# Patient Record
Sex: MALE | URBAN - METROPOLITAN AREA
[De-identification: names, ages, dates, MRNs, and addresses within clinical notes are randomized per-mention and may not be internally consistent; named-entity substitution may affect disease eponyms.]

---

## 2024-04-23 ENCOUNTER — OFFICE VISIT (OUTPATIENT)
Age: 39
End: 2024-04-23

## 2024-04-23 VITALS
BODY MASS INDEX: 38.43 KG/M2 | HEIGHT: 73 IN | OXYGEN SATURATION: 98 % | WEIGHT: 290 LBS | SYSTOLIC BLOOD PRESSURE: 132 MMHG | TEMPERATURE: 97.8 F | HEART RATE: 86 BPM | DIASTOLIC BLOOD PRESSURE: 87 MMHG

## 2024-04-23 DIAGNOSIS — Z75.8 DOES NOT HAVE PRIMARY CARE PROVIDER: ICD-10-CM

## 2024-04-23 DIAGNOSIS — S96.911A STRAIN OF RIGHT FOOT, INITIAL ENCOUNTER: Primary | ICD-10-CM

## 2024-04-23 RX ORDER — METHYLPREDNISOLONE 4 MG/1
TABLET ORAL
Qty: 1 KIT | Refills: 0 | Status: SHIPPED | OUTPATIENT
Start: 2024-04-23 | End: 2024-04-29

## 2024-04-23 ASSESSMENT — ENCOUNTER SYMPTOMS
COUGH: 0
SHORTNESS OF BREATH: 0
COLOR CHANGE: 0

## 2024-04-23 NOTE — PATIENT INSTRUCTIONS
limited activities as discussed..   take tylenol (unless has contraindications)  as needed for pain... ....use crutches for support/pain relief         can go to local/walk in \"ortho urgent care\" office). Referral to ortho provided    Return sooner or go to the ER if symptoms worse/feeling worse or has new symptoms or concerns

## 2024-04-26 ENCOUNTER — TELEPHONE (OUTPATIENT)
Age: 39
End: 2024-04-26

## 2024-04-26 NOTE — TELEPHONE ENCOUNTER
Finally able to reach patient  (repeated attempts (\"number could not be connected as dialed or number has changed\")..04/26/24 he answered phone: discussed x-ray report    No fracture, soft tissue swelling over top of foot  He says his foot is feeling better, able to bear weight, still using one crutch for support  He has not contacted ortho or primary care for further evaluation as advised at time of initial visit

## 2024-05-09 ENCOUNTER — OFFICE VISIT (OUTPATIENT)
Dept: ORTHOPEDIC SURGERY | Age: 39
End: 2024-05-09
Payer: COMMERCIAL

## 2024-05-09 VITALS — WEIGHT: 290 LBS | BODY MASS INDEX: 38.43 KG/M2 | HEIGHT: 73 IN

## 2024-05-09 DIAGNOSIS — M84.374A STRESS REACTION OF RIGHT FOOT, INITIAL ENCOUNTER: Primary | ICD-10-CM

## 2024-05-09 PROCEDURE — MISCD124 DARCO POST-OP SHOE BRACE (RETAIL): Performed by: ORTHOPAEDIC SURGERY

## 2024-05-09 PROCEDURE — 99243 OFF/OP CNSLTJ NEW/EST LOW 30: CPT | Performed by: ORTHOPAEDIC SURGERY

## 2024-05-30 ENCOUNTER — OFFICE VISIT (OUTPATIENT)
Dept: ORTHOPEDIC SURGERY | Age: 39
End: 2024-05-30
Payer: COMMERCIAL

## 2024-05-30 VITALS — WEIGHT: 290 LBS | HEIGHT: 73 IN | BODY MASS INDEX: 38.43 KG/M2

## 2024-05-30 DIAGNOSIS — M84.374A STRESS REACTION OF RIGHT FOOT, INITIAL ENCOUNTER: Primary | ICD-10-CM

## 2024-05-30 PROCEDURE — 99213 OFFICE O/P EST LOW 20 MIN: CPT | Performed by: ORTHOPAEDIC SURGERY

## 2024-05-30 NOTE — PROGRESS NOTES
who presents today in no acute distress, awake, alert, and oriented.  He is well dressed, nourished and  groomed.  Patient with normal affect.  Height is  1.854 m (6' 1\"), weight is 131.5 kg (290 lb), Body mass index is 38.26 kg/m².  Resting respiratory rate is 16.     Examination of the gait, showed that the patient walks heel-toe with no limp in postop shoe.  Examination of both ankles showing a mild decrease range of motion right ankle with foot swelling compare to the other side.  He has intact sensation and good pedal pulses.  He has good strength in all four planes, including eversion, and has no tenderness on deep palpation over the right 5th metatarsal shaft, without instability compared to the other side.  The ankles are stable to drawer test bilaterally, ankle reflex 1+ equally bilaterally.       IMAGING: Xray's were reviewed, 3 views of the right foot taken today, and showed no acute fracture. No other abnormality.    IMPRESSION:  Right lateral midfoot pain/ 5th metatarsal stress injury, possible stress fracture.    PLAN: I discussed with the patient the treatment options.  We recommended stretching exercises of the calf. He will take NSAID. Avoid heavy impact activities.  F/U in 4 weeks PRN with new standing xray right foot.       Ana Lilia Knott MD

## 2025-02-17 ENCOUNTER — HOSPITAL ENCOUNTER (EMERGENCY)
Age: 40
Discharge: HOME | End: 2025-02-17
Payer: COMMERCIAL

## 2025-02-17 VITALS
SYSTOLIC BLOOD PRESSURE: 174 MMHG | HEART RATE: 80 BPM | OXYGEN SATURATION: 99 % | WEIGHT: 295 LBS | DIASTOLIC BLOOD PRESSURE: 106 MMHG | RESPIRATION RATE: 16 BRPM | HEIGHT: 73 IN | BODY MASS INDEX: 39.1 KG/M2 | TEMPERATURE: 98 F

## 2025-02-17 DIAGNOSIS — R03.0 ELEVATED BLOOD PRESSURE READING: Primary | ICD-10-CM

## 2025-02-17 DIAGNOSIS — R51.9 INTERMITTENT HEADACHE: ICD-10-CM

## 2025-02-17 LAB
ANION GAP SERPL CALCULATED.3IONS-SCNC: 13 MMOL/L (ref 3–16)
BASOPHILS # BLD: 0 K/UL (ref 0–0.2)
BASOPHILS NFR BLD: 0.4 %
BILIRUB UR QL STRIP.AUTO: NEGATIVE
BUN SERPL-MCNC: 11 MG/DL (ref 7–20)
CALCIUM SERPL-MCNC: 9.6 MG/DL (ref 8.3–10.6)
CHLORIDE SERPL-SCNC: 101 MMOL/L (ref 99–110)
CLARITY UR: CLEAR
CO2 SERPL-SCNC: 24 MMOL/L (ref 21–32)
COLOR UR: YELLOW
CREAT SERPL-MCNC: 0.7 MG/DL (ref 0.9–1.3)
DEPRECATED RDW RBC AUTO: 12.7 % (ref 12.4–15.4)
EOSINOPHIL # BLD: 0.1 K/UL (ref 0–0.6)
EOSINOPHIL NFR BLD: 1.3 %
FLUAV RNA RESP QL NAA+PROBE: NOT DETECTED
FLUBV RNA RESP QL NAA+PROBE: NOT DETECTED
GFR SERPLBLD CREATININE-BSD FMLA CKD-EPI: >90 ML/MIN/{1.73_M2}
GLUCOSE SERPL-MCNC: 111 MG/DL (ref 70–99)
GLUCOSE UR STRIP.AUTO-MCNC: NEGATIVE MG/DL
HCT VFR BLD AUTO: 45.8 % (ref 40.5–52.5)
HGB BLD-MCNC: 16 G/DL (ref 13.5–17.5)
HGB UR QL STRIP.AUTO: NEGATIVE
KETONES UR STRIP.AUTO-MCNC: NEGATIVE MG/DL
LEUKOCYTE ESTERASE UR QL STRIP.AUTO: NEGATIVE
LYMPHOCYTES # BLD: 2.1 K/UL (ref 1–5.1)
LYMPHOCYTES NFR BLD: 23.2 %
MCH RBC QN AUTO: 32.1 PG (ref 26–34)
MCHC RBC AUTO-ENTMCNC: 35 G/DL (ref 31–36)
MCV RBC AUTO: 91.5 FL (ref 80–100)
MONOCYTES # BLD: 0.5 K/UL (ref 0–1.3)
MONOCYTES NFR BLD: 5.4 %
NEUTROPHILS # BLD: 6.3 K/UL (ref 1.7–7.7)
NEUTROPHILS NFR BLD: 69.7 %
NITRITE UR QL STRIP.AUTO: NEGATIVE
PH UR STRIP.AUTO: 6 [PH] (ref 5–8)
PLATELET # BLD AUTO: 240 K/UL (ref 135–450)
PMV BLD AUTO: 8.1 FL (ref 5–10.5)
POTASSIUM SERPL-SCNC: 4.6 MMOL/L (ref 3.5–5.1)
PROT UR STRIP.AUTO-MCNC: NEGATIVE MG/DL
RBC # BLD AUTO: 5 M/UL (ref 4.2–5.9)
SARS-COV-2 RNA RESP QL NAA+PROBE: NOT DETECTED
SODIUM SERPL-SCNC: 138 MMOL/L (ref 136–145)
SP GR UR STRIP.AUTO: 1.01 (ref 1–1.03)
TROPONIN, HIGH SENSITIVITY: <6 NG/L (ref 0–22)
UA COMPLETE W REFLEX CULTURE PNL UR: NORMAL
UA DIPSTICK W REFLEX MICRO PNL UR: NORMAL
URN SPEC COLLECT METH UR: NORMAL
UROBILINOGEN UR STRIP-ACNC: 0.2 E.U./DL
WBC # BLD AUTO: 9 K/UL (ref 4–11)

## 2025-02-17 PROCEDURE — 99284 EMERGENCY DEPT VISIT MOD MDM: CPT

## 2025-02-17 PROCEDURE — 85025 COMPLETE CBC W/AUTO DIFF WBC: CPT

## 2025-02-17 PROCEDURE — 84484 ASSAY OF TROPONIN QUANT: CPT

## 2025-02-17 PROCEDURE — 93005 ELECTROCARDIOGRAM TRACING: CPT | Performed by: EMERGENCY MEDICINE

## 2025-02-17 PROCEDURE — 87636 SARSCOV2 & INF A&B AMP PRB: CPT

## 2025-02-17 PROCEDURE — 81003 URINALYSIS AUTO W/O SCOPE: CPT

## 2025-02-17 PROCEDURE — 80048 BASIC METABOLIC PNL TOTAL CA: CPT

## 2025-02-17 ASSESSMENT — PAIN - FUNCTIONAL ASSESSMENT: PAIN_FUNCTIONAL_ASSESSMENT: 0-10

## 2025-02-17 ASSESSMENT — ENCOUNTER SYMPTOMS
SHORTNESS OF BREATH: 0
SORE THROAT: 0
COLOR CHANGE: 0
NAUSEA: 0
DIARRHEA: 0
RHINORRHEA: 0
VOMITING: 0
EYE REDNESS: 0
ABDOMINAL PAIN: 0
COUGH: 0

## 2025-02-17 ASSESSMENT — PAIN SCALES - GENERAL: PAINLEVEL_OUTOF10: 3

## 2025-02-17 ASSESSMENT — PAIN DESCRIPTION - DESCRIPTORS: DESCRIPTORS: DULL

## 2025-02-17 ASSESSMENT — PAIN DESCRIPTION - LOCATION: LOCATION: HEAD

## 2025-02-18 LAB
EKG ATRIAL RATE: 60 BPM
EKG DIAGNOSIS: NORMAL
EKG P AXIS: 44 DEGREES
EKG P-R INTERVAL: 168 MS
EKG Q-T INTERVAL: 420 MS
EKG QRS DURATION: 94 MS
EKG QTC CALCULATION (BAZETT): 420 MS
EKG R AXIS: 21 DEGREES
EKG T AXIS: 24 DEGREES
EKG VENTRICULAR RATE: 60 BPM

## 2025-02-18 PROCEDURE — 93010 ELECTROCARDIOGRAM REPORT: CPT | Performed by: INTERNAL MEDICINE

## 2025-02-18 NOTE — ED PROVIDER NOTES
Use [id number] or GENEDNOTE      [facility] EMERGENCY DEPARTMENT  EMERGENCY DEPARTMENT ENCOUNTER        Pt Name: Mike Samayoa  MRN: [id number]  Birthdate 1985  Date of evaluation: 2/17/2025  Provider: SUNNY Tadeo  PCP: Shiva Garza MD  Note Started: 11:31 PM EST 2/17/25      JOHN. I have evaluated this patient.        CHIEF COMPLAINT       Chief Complaint   Patient presents with    Hypertension     High BP at home recently. No history of HTN.        HISTORY OF PRESENT ILLNESS: 1 or more Elements     History From: Patient  Limitations to history : None    Mike Samayoa is a 39 y.o. male who presents to the emergency department due to concern for elevated blood pressure readings.  Patient states he has been checking his blood pressure at home, and lately it has been elevated.  Today he felt kind of weird, although has difficulty describing exactly what was going on.  He decided to check his blood pressure.  It was elevated.  He then took it multiple more times within the hour, and it continued to rise, to a maximum of 212/120, which is why he came in for evaluation.  Patient reports a feeling of pressure across the front of his head.  He has had this intermittently for the last few days.  He denies any acute head pain.  Patient denies past history of hypertension.  He is not on any medications.  He denies cardiac history.  He denies chest pain or shortness of breath.  Patient denies any focal neurologic deficits.  No other acute complaints.    Nursing Notes were all reviewed and agreed with or any disagreements were addressed in the HPI.    REVIEW OF SYSTEMS :      Review of Systems   Constitutional:  Negative for chills, fatigue and fever.   HENT:  Negative for congestion, rhinorrhea and sore throat.    Eyes:  Negative for redness.   Respiratory:  Negative for cough and shortness of breath.    Cardiovascular:  Negative for chest pain and palpitations.   Gastrointestinal:  Negative for

## 2025-02-23 SDOH — HEALTH STABILITY: PHYSICAL HEALTH: ON AVERAGE, HOW MANY MINUTES DO YOU ENGAGE IN EXERCISE AT THIS LEVEL?: 40 MIN

## 2025-02-23 SDOH — HEALTH STABILITY: PHYSICAL HEALTH: ON AVERAGE, HOW MANY DAYS PER WEEK DO YOU ENGAGE IN MODERATE TO STRENUOUS EXERCISE (LIKE A BRISK WALK)?: 1 DAY

## 2025-02-24 SDOH — ECONOMIC STABILITY: INCOME INSECURITY: IN THE LAST 12 MONTHS, WAS THERE A TIME WHEN YOU WERE NOT ABLE TO PAY THE MORTGAGE OR RENT ON TIME?: NO

## 2025-02-24 SDOH — ECONOMIC STABILITY: FOOD INSECURITY: WITHIN THE PAST 12 MONTHS, THE FOOD YOU BOUGHT JUST DIDN'T LAST AND YOU DIDN'T HAVE MONEY TO GET MORE.: NEVER TRUE

## 2025-02-24 SDOH — ECONOMIC STABILITY: FOOD INSECURITY: WITHIN THE PAST 12 MONTHS, YOU WORRIED THAT YOUR FOOD WOULD RUN OUT BEFORE YOU GOT MONEY TO BUY MORE.: NEVER TRUE

## 2025-02-24 SDOH — ECONOMIC STABILITY: TRANSPORTATION INSECURITY
IN THE PAST 12 MONTHS, HAS LACK OF TRANSPORTATION KEPT YOU FROM MEETINGS, WORK, OR FROM GETTING THINGS NEEDED FOR DAILY LIVING?: NO

## 2025-02-24 SDOH — ECONOMIC STABILITY: TRANSPORTATION INSECURITY
IN THE PAST 12 MONTHS, HAS THE LACK OF TRANSPORTATION KEPT YOU FROM MEDICAL APPOINTMENTS OR FROM GETTING MEDICATIONS?: NO

## 2025-02-26 ENCOUNTER — OFFICE VISIT (OUTPATIENT)
Age: 40
End: 2025-02-26
Payer: COMMERCIAL

## 2025-02-26 VITALS
WEIGHT: 290.5 LBS | HEART RATE: 88 BPM | HEIGHT: 74 IN | SYSTOLIC BLOOD PRESSURE: 134 MMHG | OXYGEN SATURATION: 98 % | DIASTOLIC BLOOD PRESSURE: 86 MMHG | BODY MASS INDEX: 37.28 KG/M2

## 2025-02-26 DIAGNOSIS — Z11.59 NEED FOR HEPATITIS C SCREENING TEST: ICD-10-CM

## 2025-02-26 DIAGNOSIS — Z11.4 SCREENING FOR HIV WITHOUT PRESENCE OF RISK FACTORS: ICD-10-CM

## 2025-02-26 DIAGNOSIS — E66.812 CLASS 2 OBESITY: ICD-10-CM

## 2025-02-26 DIAGNOSIS — Z76.89 ENCOUNTER TO ESTABLISH CARE: Primary | ICD-10-CM

## 2025-02-26 DIAGNOSIS — R03.0 ELEVATED BP WITHOUT DIAGNOSIS OF HYPERTENSION: ICD-10-CM

## 2025-02-26 DIAGNOSIS — R29.818 SUSPECTED SLEEP APNEA: ICD-10-CM

## 2025-02-26 PROBLEM — M84.374A STRESS REACTION OF RIGHT FOOT: Status: RESOLVED | Noted: 2024-05-09 | Resolved: 2025-02-26

## 2025-02-26 PROCEDURE — G8417 CALC BMI ABV UP PARAM F/U: HCPCS | Performed by: INTERNAL MEDICINE

## 2025-02-26 PROCEDURE — G8427 DOCREV CUR MEDS BY ELIG CLIN: HCPCS | Performed by: INTERNAL MEDICINE

## 2025-02-26 PROCEDURE — 90471 IMMUNIZATION ADMIN: CPT | Performed by: INTERNAL MEDICINE

## 2025-02-26 PROCEDURE — 1036F TOBACCO NON-USER: CPT | Performed by: INTERNAL MEDICINE

## 2025-02-26 PROCEDURE — 90715 TDAP VACCINE 7 YRS/> IM: CPT | Performed by: INTERNAL MEDICINE

## 2025-02-26 PROCEDURE — 99204 OFFICE O/P NEW MOD 45 MIN: CPT | Performed by: INTERNAL MEDICINE

## 2025-02-26 RX ORDER — VIT C/B6/B5/MAGNESIUM/HERB 173 50-5-6-5MG
2000 CAPSULE ORAL DAILY
COMMUNITY

## 2025-02-26 ASSESSMENT — ENCOUNTER SYMPTOMS
ABDOMINAL PAIN: 0
CONSTIPATION: 0
SORE THROAT: 0
COUGH: 0
SHORTNESS OF BREATH: 0

## 2025-02-26 ASSESSMENT — PATIENT HEALTH QUESTIONNAIRE - PHQ9
SUM OF ALL RESPONSES TO PHQ QUESTIONS 1-9: 0
1. LITTLE INTEREST OR PLEASURE IN DOING THINGS: NOT AT ALL
SUM OF ALL RESPONSES TO PHQ9 QUESTIONS 1 & 2: 0
SUM OF ALL RESPONSES TO PHQ QUESTIONS 1-9: 0
SUM OF ALL RESPONSES TO PHQ QUESTIONS 1-9: 0
2. FEELING DOWN, DEPRESSED OR HOPELESS: NOT AT ALL
SUM OF ALL RESPONSES TO PHQ QUESTIONS 1-9: 0

## 2025-02-26 NOTE — PROGRESS NOTES
Mike Samayoa (:  1985) is a 39 y.o. male here for evaluation of the following chief complaint(s):  New Patient, Hypertension, Dizziness (Occasional dizziness when standing), and Tremors (Mild bilateral feet tremors)      Subjective   39-year-old male with class II obesity, elevated blood pressure presents to the clinic to establish care/ER follow-up    Patient was evaluated in the ER recently for elevated blood pressure.  Patient reports that he has been taking his blood pressure at home and it was very high so he came to the ER for evaluation.  He reports that one of his friend was diagnosed with high blood pressure.  So he went to [facility] and got blood pressure kit recently.  He reports that he drinks double espresso coffee.  Patient reports snoring and does not get good sleep.  Patient reports that he gets tired when he wakes up in the morning.  Patient has been checking his blood pressure at home and they are in the range of 150s to 200s over 80s to 100s.  Patient's blood pressure today is 132/86.  There is no difference in blood pressure in both arms when checked in the office.  Patient reports mild dizziness, headaches and ?  Tremors.  Patient denies any excessive sweating.        Review of Systems   Constitutional:  Negative for fatigue and fever.   HENT:  Negative for postnasal drip and sore throat.    Respiratory:  Negative for cough and shortness of breath.    Cardiovascular:  Negative for chest pain and leg swelling.   Gastrointestinal:  Negative for abdominal pain and constipation.   Genitourinary:  Negative for dysuria and hematuria.   Musculoskeletal:  Negative for arthralgias and myalgias.   Skin:  Negative for rash.   Neurological:  Positive for light-headedness and headaches. Negative for weakness.   Psychiatric/Behavioral:  Negative for hallucinations and suicidal ideas.           Objective   Physical Exam  Vitals reviewed.   Constitutional:       Appearance: Normal appearance.

## 2025-02-26 NOTE — PATIENT INSTRUCTIONS
Vaccine Information Sheet: Tdap  given to Mike Samayoa for review and verbalized understanding of material. Mike Samayoa was given the opportunity to ask questions.   Documented vaccine given per order.     VIS given and made available within patient's chart for future reference.

## 2025-02-27 DIAGNOSIS — Z11.59 NEED FOR HEPATITIS C SCREENING TEST: ICD-10-CM

## 2025-02-27 DIAGNOSIS — R74.01 ELEVATED ALT MEASUREMENT: Primary | ICD-10-CM

## 2025-02-27 DIAGNOSIS — E66.812 CLASS 2 OBESITY: ICD-10-CM

## 2025-02-27 DIAGNOSIS — R03.0 ELEVATED BP WITHOUT DIAGNOSIS OF HYPERTENSION: ICD-10-CM

## 2025-02-27 DIAGNOSIS — Z11.4 SCREENING FOR HIV WITHOUT PRESENCE OF RISK FACTORS: ICD-10-CM

## 2025-02-27 LAB
ALBUMIN SERPL-MCNC: 4.6 G/DL (ref 3.4–5)
ALP SERPL-CCNC: 60 U/L (ref 40–129)
ALT SERPL-CCNC: 57 U/L (ref 10–40)
AST SERPL-CCNC: 27 U/L (ref 15–37)
BILIRUB DIRECT SERPL-MCNC: 0.2 MG/DL (ref 0–0.3)
BILIRUB INDIRECT SERPL-MCNC: 0.2 MG/DL (ref 0–1)
BILIRUB SERPL-MCNC: 0.4 MG/DL (ref 0–1)
CHOLEST SERPL-MCNC: 177 MG/DL (ref 0–199)
CORTIS AM PEAK SERPL-MCNC: 9.4 UG/DL (ref 4.3–22.4)
EST. AVERAGE GLUCOSE BLD GHB EST-MCNC: 88.2 MG/DL
HBA1C MFR BLD: 4.7 %
HCV AB SERPL QL IA: NORMAL
HDLC SERPL-MCNC: 31 MG/DL (ref 40–60)
LDLC SERPL CALC-MCNC: 120 MG/DL
PROT SERPL-MCNC: 7.6 G/DL (ref 6.4–8.2)
TRIGL SERPL-MCNC: 129 MG/DL (ref 0–150)
TSH SERPL DL<=0.005 MIU/L-ACNC: 1.66 UIU/ML (ref 0.27–4.2)
VLDLC SERPL CALC-MCNC: 26 MG/DL

## 2025-02-28 LAB
HIV 1+2 AB+HIV1 P24 AG SERPL QL IA: NORMAL
HIV 2 AB SERPL QL IA: NORMAL
HIV1 AB SERPL QL IA: NORMAL
HIV1 P24 AG SERPL QL IA: NORMAL

## 2025-03-02 LAB
ALDOST SERPL-MCNC: 12.6 NG/DL
ALDOST/RENIN PLAS-RTO: 7 RATIO
RENIN PLAS-CCNC: 1.8 NG/ML/HR

## 2025-03-03 LAB
ANNOTATION COMMENT IMP: NORMAL
HBV SURFACE AG SERPL QL IA: NORMAL
METANEPHS SERPL-SCNC: 0.15 NMOL/L (ref 0–0.49)
NORMETANEPHRINE SERPL-SCNC: 0.33 NMOL/L (ref 0–0.89)

## 2025-03-03 ASSESSMENT — SLEEP AND FATIGUE QUESTIONNAIRES
HOW LIKELY ARE YOU TO NOD OFF OR FALL ASLEEP IN A CAR, WHILE STOPPED FOR A FEW MINUTES IN TRAFFIC: SLIGHT CHANCE OF DOZING
HOW LIKELY ARE YOU TO NOD OFF OR FALL ASLEEP IN A CAR, WHILE STOPPED FOR A FEW MINUTES IN TRAFFIC: SLIGHT CHANCE OF DOZING
HOW LIKELY ARE YOU TO NOD OFF OR FALL ASLEEP WHILE SITTING QUIETLY AFTER LUNCH WITHOUT ALCOHOL: MODERATE CHANCE OF DOZING
HOW LIKELY ARE YOU TO NOD OFF OR FALL ASLEEP WHILE WATCHING TV: MODERATE CHANCE OF DOZING
HOW LIKELY ARE YOU TO NOD OFF OR FALL ASLEEP WHILE SITTING AND TALKING TO SOMEONE: SLIGHT CHANCE OF DOZING
HOW LIKELY ARE YOU TO NOD OFF OR FALL ASLEEP WHILE SITTING INACTIVE IN A PUBLIC PLACE: SLIGHT CHANCE OF DOZING
HOW LIKELY ARE YOU TO NOD OFF OR FALL ASLEEP WHEN YOU ARE A PASSENGER IN A CAR FOR AN HOUR WITHOUT A BREAK: SLIGHT CHANCE OF DOZING
ESS TOTAL SCORE: 11
HOW LIKELY ARE YOU TO NOD OFF OR FALL ASLEEP WHILE SITTING INACTIVE IN A PUBLIC PLACE: SLIGHT CHANCE OF DOZING
HOW LIKELY ARE YOU TO NOD OFF OR FALL ASLEEP WHILE WATCHING TV: MODERATE CHANCE OF DOZING
HOW LIKELY ARE YOU TO NOD OFF OR FALL ASLEEP WHILE SITTING QUIETLY AFTER LUNCH WITHOUT ALCOHOL: MODERATE CHANCE OF DOZING
HOW LIKELY ARE YOU TO NOD OFF OR FALL ASLEEP WHILE SITTING AND TALKING TO SOMEONE: SLIGHT CHANCE OF DOZING
HOW LIKELY ARE YOU TO NOD OFF OR FALL ASLEEP WHILE SITTING AND READING: MODERATE CHANCE OF DOZING
HOW LIKELY ARE YOU TO NOD OFF OR FALL ASLEEP WHEN YOU ARE A PASSENGER IN A CAR FOR AN HOUR WITHOUT A BREAK: SLIGHT CHANCE OF DOZING
HOW LIKELY ARE YOU TO NOD OFF OR FALL ASLEEP WHILE LYING DOWN TO REST IN THE AFTERNOON WHEN CIRCUMSTANCES PERMIT: SLIGHT CHANCE OF DOZING
HOW LIKELY ARE YOU TO NOD OFF OR FALL ASLEEP WHILE SITTING AND READING: MODERATE CHANCE OF DOZING
HOW LIKELY ARE YOU TO NOD OFF OR FALL ASLEEP WHILE LYING DOWN TO REST IN THE AFTERNOON WHEN CIRCUMSTANCES PERMIT: SLIGHT CHANCE OF DOZING

## 2025-03-05 ENCOUNTER — OFFICE VISIT (OUTPATIENT)
Dept: PULMONOLOGY | Age: 40
End: 2025-03-05
Payer: COMMERCIAL

## 2025-03-05 VITALS
DIASTOLIC BLOOD PRESSURE: 80 MMHG | BODY MASS INDEX: 37.29 KG/M2 | SYSTOLIC BLOOD PRESSURE: 122 MMHG | HEART RATE: 95 BPM | OXYGEN SATURATION: 97 % | WEIGHT: 290.6 LBS | HEIGHT: 74 IN

## 2025-03-05 DIAGNOSIS — R06.81 WITNESSED EPISODE OF APNEA: ICD-10-CM

## 2025-03-05 DIAGNOSIS — R03.0 ELEVATED BP WITHOUT DIAGNOSIS OF HYPERTENSION: ICD-10-CM

## 2025-03-05 DIAGNOSIS — G47.10 HYPERSOMNIA: Primary | ICD-10-CM

## 2025-03-05 DIAGNOSIS — R06.89 GASPING FOR BREATH: ICD-10-CM

## 2025-03-05 DIAGNOSIS — E66.01 SEVERE OBESITY: ICD-10-CM

## 2025-03-05 DIAGNOSIS — R06.83 SNORING: ICD-10-CM

## 2025-03-05 PROCEDURE — 99214 OFFICE O/P EST MOD 30 MIN: CPT | Performed by: STUDENT IN AN ORGANIZED HEALTH CARE EDUCATION/TRAINING PROGRAM

## 2025-03-05 PROCEDURE — G8417 CALC BMI ABV UP PARAM F/U: HCPCS | Performed by: STUDENT IN AN ORGANIZED HEALTH CARE EDUCATION/TRAINING PROGRAM

## 2025-03-05 PROCEDURE — 1036F TOBACCO NON-USER: CPT | Performed by: STUDENT IN AN ORGANIZED HEALTH CARE EDUCATION/TRAINING PROGRAM

## 2025-03-05 PROCEDURE — G8427 DOCREV CUR MEDS BY ELIG CLIN: HCPCS | Performed by: STUDENT IN AN ORGANIZED HEALTH CARE EDUCATION/TRAINING PROGRAM

## 2025-03-05 NOTE — PATIENT INSTRUCTIONS
and lower headgear clips, with a magnetic field strength of up to 400mT. When worn, they connect to secure the mask but may inadvertently detach while asleep.  Implants/medical devices, including those listed within contraindications, may be adversely affected if they change function under external magnetic fields or contain ferromagnetic materials that attract/repel to magnetic fields (some metallic implants, e.g., contact lenses with metal, dental implants, metallic cranial plates, screws, jarvis hole covers, and bone substitute devices). Consult your physician and  of your implant / other medical device for information on the potential adverse effects of magnetic fields.  Note, not all models or variants of medical devices listed in the contraindications are affected by external magnetic fields. If you are not sure whether an implant or medical device falls under the contraindications, or you require additional information on the potential adverse effects of magnetic fields for your particular implant or medical device, please contact your physician/doctor.    Contact Information   Customers in the [place] with questions about this recall should contact [facility] at call [phone].

## 2025-03-05 NOTE — PROGRESS NOTES
[facility]  Sleep Medicine  [facility], [address]  [place] [address]    Chief Complaint   Patient presents with    New Patient     Ref: Dr. Stark     Snoring    Daytime Sleepiness    witnessed apnea       Mike Samayoa is a 39 y.o. male who comes in for sleep evaluation.  His complaints include snoring, excessive daytime sleepiness, loud snoring, gasping for air at night, nocturnal hypoxemia (detected by wearable), hypertension / elevated BP, and easily falling asleep during idle situations. Onset of symptoms was several years ago.     Pertinent PMHx includes: severe obesity.    He goes to bed at 10:30-11:30pm. He falls asleep in  a couple of minutes.  He awakens about 3 times per night. He awakens at 6:15-7am. The average total amount of sleep is about 7 hours per night. He does not use sleep aid/s. He does take daytime naps. He describes the symptoms as daytime sleepiness, witnessed apneas, disrupted sleep, waking up in the middle of the night gasping for air, falling asleep/dosing off during idle situations, and morning dry mouth. He  might  have symptoms consistent / suggestive of restless legs syndrome (unsure). He has felt extremely sleepy while driving. He denies recent significant weight gain. Previous evaluation and treatment has included: none.    Family hx of sleep disorders:  cousin with ROJAS  Caffeinated drinks/day: 3-4 cups of coffee per day  Alcohol intake/day/week: a few drinks per week  Occupation: Data       DATA REVIEWED TODAY:  [place] & Insomnia Severity Index scores      3/3/2025     5:54 PM   Sleep Medicine   Sitting and reading 2   Watching TV 2   Sitting, inactive in a public place (e.g. a theatre or a meeting) 1   As a passenger in a car for an hour without a break 1   Lying down to rest in the afternoon when circumstances permit 1   Sitting and talking to someone 1   Sitting quietly after a lunch without alcohol 2   In a car, while stopped for a few minutes

## 2025-03-11 ENCOUNTER — HOSPITAL ENCOUNTER (OUTPATIENT)
Dept: VASCULAR LAB | Age: 40
Discharge: HOME | End: 2025-03-13
Attending: INTERNAL MEDICINE
Payer: COMMERCIAL

## 2025-03-11 DIAGNOSIS — R03.0 ELEVATED BP WITHOUT DIAGNOSIS OF HYPERTENSION: ICD-10-CM

## 2025-03-11 PROCEDURE — 93975 VASCULAR STUDY: CPT

## 2025-03-13 LAB
VAS AORTA MID AP: 1.92 CM
VAS AORTA MID PSV: 147 CM/S
VAS AORTA MID TRANS: 1.61 CM
VAS L RENAL ORIG RI: 0.64
VAS LEFT KIDNEY LENGTH: 13.43 CM
VAS LEFT KIDNEY WIDTH: 5.94 CM
VAS LEFT RENAL DIST EDV: 35.6 CM/S
VAS LEFT RENAL DIST PSV: 81.1 CM/S
VAS LEFT RENAL DIST RAR: 0.55
VAS LEFT RENAL DIST RI: 0.56
VAS LEFT RENAL LOWER PARENCHYMA EDV: 7.7 CM/S
VAS LEFT RENAL LOWER PARENCHYMA PSV: 16.6 CM/S
VAS LEFT RENAL LOWER PARENCHYMA RI: 0.54 NO UNITS
VAS LEFT RENAL MID EDV: 34 CM/S
VAS LEFT RENAL MID PSV: 109 CM/S
VAS LEFT RENAL MID RAR: 0.74
VAS LEFT RENAL MID RI: 0.69
VAS LEFT RENAL ORIGIN EDV: 43 CM/S
VAS LEFT RENAL ORIGIN PSV: 119 CM/S
VAS LEFT RENAL ORIGIN RAR: 0.81
VAS LEFT RENAL RAR: 0.81
VAS LEFT RENAL UPPER PARENCHYMA EDV: 8.1 CM/S
VAS LEFT RENAL UPPER PARENCHYMA PSV: 19.6 CM/S
VAS LEFT RENAL UPPER PARENCHYMA RI: 0.59 NO UNITS
VAS RIGHT KIDNEY LENGTH: 13.28 CM
VAS RIGHT KIDNEY WIDTH: 6.17 CM
VAS RIGHT RENAL DIST EDV: 48.3 CM/S
VAS RIGHT RENAL DIST PSV: 146 CM/S
VAS RIGHT RENAL DIST RAR: 0.99
VAS RIGHT RENAL DIST RI: 0.67
VAS RIGHT RENAL LOWER PARENCHYMA EDV: 6.1 CM/S
VAS RIGHT RENAL LOWER PARENCHYMA PSV: 15.7 CM/S
VAS RIGHT RENAL LOWER PARENCHYMA RI: 0.61 NO UNITS
VAS RIGHT RENAL MID EDV: 34.1 CM/S
VAS RIGHT RENAL MID PSV: 94.2 CM/S
VAS RIGHT RENAL MID RAR: 0.64
VAS RIGHT RENAL MID RI: 0.64
VAS RIGHT RENAL ORIGIN EDV: 55.7 CM/S
VAS RIGHT RENAL ORIGIN PSV: 177 CM/S
VAS RIGHT RENAL ORIGIN RAR: 1.2
VAS RIGHT RENAL ORIGIN RI: 0.69
VAS RIGHT RENAL RAR: 1.2
VAS RIGHT RENAL UPPER PARENCHYMA EDV: 6.9 CM/S
VAS RIGHT RENAL UPPER PARENCHYMA PSV: 18.2 CM/S
VAS RIGHT RENAL UPPER PARENCHYMA RI: 0.62 NO UNITS

## 2025-03-14 ENCOUNTER — RESULTS FOLLOW-UP (OUTPATIENT)
Dept: VASCULAR LAB | Age: 40
End: 2025-03-14

## 2025-03-25 ENCOUNTER — OFFICE VISIT (OUTPATIENT)
Age: 40
End: 2025-03-25
Payer: COMMERCIAL

## 2025-03-25 VITALS
WEIGHT: 289.3 LBS | BODY MASS INDEX: 37.14 KG/M2 | SYSTOLIC BLOOD PRESSURE: 126 MMHG | OXYGEN SATURATION: 98 % | DIASTOLIC BLOOD PRESSURE: 78 MMHG | HEART RATE: 64 BPM

## 2025-03-25 DIAGNOSIS — R29.818 SUSPECTED SLEEP APNEA: ICD-10-CM

## 2025-03-25 DIAGNOSIS — R03.0 ELEVATED BP WITHOUT DIAGNOSIS OF HYPERTENSION: Primary | ICD-10-CM

## 2025-03-25 PROCEDURE — 99213 OFFICE O/P EST LOW 20 MIN: CPT | Performed by: INTERNAL MEDICINE

## 2025-03-25 PROCEDURE — G8417 CALC BMI ABV UP PARAM F/U: HCPCS | Performed by: INTERNAL MEDICINE

## 2025-03-25 PROCEDURE — 1036F TOBACCO NON-USER: CPT | Performed by: INTERNAL MEDICINE

## 2025-03-25 PROCEDURE — G8427 DOCREV CUR MEDS BY ELIG CLIN: HCPCS | Performed by: INTERNAL MEDICINE

## 2025-03-25 ASSESSMENT — ENCOUNTER SYMPTOMS
SORE THROAT: 0
SHORTNESS OF BREATH: 0
CONSTIPATION: 0
ABDOMINAL PAIN: 0
COUGH: 0

## 2025-03-25 NOTE — PROGRESS NOTES
Mike Samayoa (:  1985) is a 39 y.o. male here for evaluation of the following chief complaint(s):  Hypertension      Subjective   39-year-old male with class II obesity, elevated blood pressure readings presents to the clinic for follow up of BP.     Patient's blood pressure is within normal limits.  Patient brought in a log and the readings are in the range of 140s to 160s by 80s to 90s most of the time when he checks it at home using an automatic BP monitor.  He denies any symptoms at this time.  He has suspected sleep apnea for which he was referred to sleep study and he has an appointment coming up.  Secondary workup for blood pressure is negative.        Review of Systems   Constitutional:  Negative for fatigue and fever.   HENT:  Negative for postnasal drip and sore throat.    Respiratory:  Negative for cough and shortness of breath.    Cardiovascular:  Negative for chest pain and leg swelling.   Gastrointestinal:  Negative for abdominal pain and constipation.   Genitourinary:  Negative for dysuria and hematuria.   Musculoskeletal:  Negative for arthralgias and myalgias.   Skin:  Negative for rash.   Neurological:  Negative for weakness and headaches.   Psychiatric/Behavioral:  Negative for hallucinations and suicidal ideas.           Objective   Physical Exam  Vitals reviewed.   Constitutional:       Appearance: Normal appearance. He is obese.   HENT:      Head: Normocephalic and atraumatic.      Mouth/Throat:      Mouth: Mucous membranes are moist.   Eyes:      Extraocular Movements: Extraocular movements intact.      Conjunctiva/sclera: Conjunctivae normal.   Cardiovascular:      Rate and Rhythm: Normal rate and regular rhythm.      Pulses: Normal pulses.      Heart sounds: Normal heart sounds.   Pulmonary:      Effort: Pulmonary effort is normal.      Breath sounds: Normal breath sounds.   Abdominal:      Palpations: Abdomen is soft.      Comments: Abdominal striae   Musculoskeletal:

## 2025-03-31 ENCOUNTER — HOSPITAL ENCOUNTER (OUTPATIENT)
Dept: SLEEP CENTER | Age: 40
Discharge: HOME | End: 2025-03-31
Payer: COMMERCIAL

## 2025-03-31 DIAGNOSIS — R06.81 WITNESSED EPISODE OF APNEA: ICD-10-CM

## 2025-03-31 DIAGNOSIS — R03.0 ELEVATED BP WITHOUT DIAGNOSIS OF HYPERTENSION: ICD-10-CM

## 2025-03-31 DIAGNOSIS — E66.01 SEVERE OBESITY: ICD-10-CM

## 2025-03-31 DIAGNOSIS — G47.10 HYPERSOMNIA: ICD-10-CM

## 2025-03-31 DIAGNOSIS — R06.89 GASPING FOR BREATH: ICD-10-CM

## 2025-03-31 DIAGNOSIS — R06.83 SNORING: ICD-10-CM

## 2025-03-31 PROCEDURE — 95806 SLEEP STUDY UNATT&RESP EFFT: CPT

## 2025-04-07 ENCOUNTER — TELEPHONE (OUTPATIENT)
Dept: PULMONOLOGY | Age: 40
End: 2025-04-07

## 2025-04-07 NOTE — TELEPHONE ENCOUNTER
Pt advised. Rx faxed to [facility]. Follow up scheduled 6/18/25. Number given to follow up with [facility].

## 2025-04-07 NOTE — TELEPHONE ENCOUNTER
Pt called returning call for ss results. Informed pt that no one is available at the time but can reach back out.     Ph. [phone]

## 2025-04-07 NOTE — TELEPHONE ENCOUNTER
Please inform patient that his sleep study showed moderate sleep apnea and that he stopped breathing or his breathing was inadequate about 21 times for every hour of sleep. His blood oxygen also dropped as low as 76% during the night.     If he agrees I will order a CPAP via a durable medical equipment company of their choice (if no preference, we will go with [employer], based on location) and they will reach out to keep him updated on the process. This will be the company that is going to work with him insurance and provide the CPAP device, along with replacing the mask and other supplies going forward. If they have any questions, they can let you know or message me via [employer]. If patient agrees with plan, please route the PAP order to DME company (order already completed on a separate order encounter). Patient should be scheduled for 31 to 90 days follow up.    Thanks  Iván Beltran MD

## 2025-04-07 NOTE — PROGRESS NOTES
Patient choice -Size and fit mask [x] Patient Choice - Size and fit mask   [] Dispense: nasal cushion  [] Dispense:  [x] Dispense: full face mask  [] Dispense:    [] Headgear () / 1 per 3 months [x] Headgear () / 1 per 3 months   [] Replacement Nasal Cushion ()/2 per month [x] Interface Replacement ()/1 per month   [] Replacement Nasal Pillows ()/2 per month       Tubing: [x] Heated ()/1 per 3 months                           [] Standard ()/1 per 3 months  [] Other:____________________   Filters: [x] Non-disposable ()/1 per 6 months                 [x] Ultra-Fine, Disposable ()/2 per month     Miscellaneous: [x] Chin Strap as needed ()/ 1 per 6months      [] Other:__________________________________         Start Order Date: 04/07/25    MEDICAL JUSTIFICATION:  I, the undersigned, certify that the above prescribed supplies are medically necessary for this patient’s wellbeing.  In my opinion, the supplies are both reasonable and necessary in reference to accepted standards of medicalpractice in treatment of this patient’s condition.    Iván Beltran MD    NPI: [id number]       Order Signed Date: 04/07/25      Eliel Samayoa  1985  [address] [place] [id number]  [phone] (home) [phone] (work)  [phone] (mobile)    Insurance:   Active Insurance as of 4/7/2025       Primary Coverage       Payor Plan Insurance Group Employer/Plan Group    [facility] [address] [id number]       Payor Plan Address Payor Plan Phone Number Payor Plan Fax Number Effective Dates    [address]   1/1/2022 - None Entered    [facility] [id number]         Subscriber Name Subscriber Birth Date Member ID       DAY,ELIEL ROJO 1985 [id number]                      Electronically signed by Iván Beltran MD on 04/07/25 at 10:49 AM.

## 2025-06-15 ASSESSMENT — SLEEP AND FATIGUE QUESTIONNAIRES
ESS TOTAL SCORE: 8
HOW LIKELY ARE YOU TO NOD OFF OR FALL ASLEEP WHILE SITTING AND READING: MODERATE CHANCE OF DOZING
HOW LIKELY ARE YOU TO NOD OFF OR FALL ASLEEP WHILE SITTING INACTIVE IN A PUBLIC PLACE: SLIGHT CHANCE OF DOZING
HOW LIKELY ARE YOU TO NOD OFF OR FALL ASLEEP WHEN YOU ARE A PASSENGER IN A CAR FOR AN HOUR WITHOUT A BREAK: SLIGHT CHANCE OF DOZING
HOW LIKELY ARE YOU TO NOD OFF OR FALL ASLEEP IN A CAR, WHILE STOPPED FOR A FEW MINUTES IN TRAFFIC: WOULD NEVER DOZE
HOW LIKELY ARE YOU TO NOD OFF OR FALL ASLEEP WHILE SITTING AND READING: MODERATE CHANCE OF DOZING
HOW LIKELY ARE YOU TO NOD OFF OR FALL ASLEEP WHILE SITTING QUIETLY AFTER LUNCH WITHOUT ALCOHOL: WOULD NEVER DOZE
HOW LIKELY ARE YOU TO NOD OFF OR FALL ASLEEP IN A CAR, WHILE STOPPED FOR A FEW MINUTES IN TRAFFIC: WOULD NEVER DOZE
HOW LIKELY ARE YOU TO NOD OFF OR FALL ASLEEP WHILE SITTING AND TALKING TO SOMEONE: SLIGHT CHANCE OF DOZING
HOW LIKELY ARE YOU TO NOD OFF OR FALL ASLEEP WHILE WATCHING TV: SLIGHT CHANCE OF DOZING
HOW LIKELY ARE YOU TO NOD OFF OR FALL ASLEEP WHILE WATCHING TV: SLIGHT CHANCE OF DOZING
HOW LIKELY ARE YOU TO NOD OFF OR FALL ASLEEP WHILE LYING DOWN TO REST IN THE AFTERNOON WHEN CIRCUMSTANCES PERMIT: MODERATE CHANCE OF DOZING
HOW LIKELY ARE YOU TO NOD OFF OR FALL ASLEEP WHILE SITTING QUIETLY AFTER LUNCH WITHOUT ALCOHOL: WOULD NEVER DOZE
HOW LIKELY ARE YOU TO NOD OFF OR FALL ASLEEP WHEN YOU ARE A PASSENGER IN A CAR FOR AN HOUR WITHOUT A BREAK: SLIGHT CHANCE OF DOZING
HOW LIKELY ARE YOU TO NOD OFF OR FALL ASLEEP WHILE SITTING AND TALKING TO SOMEONE: SLIGHT CHANCE OF DOZING
HOW LIKELY ARE YOU TO NOD OFF OR FALL ASLEEP WHILE SITTING INACTIVE IN A PUBLIC PLACE: SLIGHT CHANCE OF DOZING
HOW LIKELY ARE YOU TO NOD OFF OR FALL ASLEEP WHILE LYING DOWN TO REST IN THE AFTERNOON WHEN CIRCUMSTANCES PERMIT: MODERATE CHANCE OF DOZING

## 2025-06-18 ENCOUNTER — OFFICE VISIT (OUTPATIENT)
Dept: PULMONOLOGY | Age: 40
End: 2025-06-18
Payer: COMMERCIAL

## 2025-06-18 VITALS
WEIGHT: 297.8 LBS | HEIGHT: 74 IN | DIASTOLIC BLOOD PRESSURE: 66 MMHG | OXYGEN SATURATION: 97 % | SYSTOLIC BLOOD PRESSURE: 128 MMHG | BODY MASS INDEX: 38.22 KG/M2 | HEART RATE: 86 BPM

## 2025-06-18 DIAGNOSIS — E66.01 SEVERE OBESITY (HCC): ICD-10-CM

## 2025-06-18 DIAGNOSIS — G47.33 OSA ON CPAP: Primary | ICD-10-CM

## 2025-06-18 PROCEDURE — 99213 OFFICE O/P EST LOW 20 MIN: CPT | Performed by: STUDENT IN AN ORGANIZED HEALTH CARE EDUCATION/TRAINING PROGRAM

## 2025-06-18 PROCEDURE — G2211 COMPLEX E/M VISIT ADD ON: HCPCS | Performed by: STUDENT IN AN ORGANIZED HEALTH CARE EDUCATION/TRAINING PROGRAM

## 2025-06-18 PROCEDURE — G8427 DOCREV CUR MEDS BY ELIG CLIN: HCPCS | Performed by: STUDENT IN AN ORGANIZED HEALTH CARE EDUCATION/TRAINING PROGRAM

## 2025-06-18 PROCEDURE — G8417 CALC BMI ABV UP PARAM F/U: HCPCS | Performed by: STUDENT IN AN ORGANIZED HEALTH CARE EDUCATION/TRAINING PROGRAM

## 2025-06-18 PROCEDURE — 1036F TOBACCO NON-USER: CPT | Performed by: STUDENT IN AN ORGANIZED HEALTH CARE EDUCATION/TRAINING PROGRAM

## 2025-06-18 NOTE — PROGRESS NOTES
[facility]  Sleep Medicine  [facility], [address]  [place] [address]    Chief Complaint   Patient presents with    CNFU     No complaints of machine         Mike Samayoa comes in today for sleep apnea follow-up . He was diagnosed with moderate obstructive sleep apnea and is being treated with PAP therapy.   He has been on PAP therapy for a couple of months.  He states he wears the PAP device most nights.     He falls asleep in few minutes.  He awakens 2-3 times per night but usually he is able to fall back to sleep. The average total amount of sleep is about 7 hours per night and takes no naps. He does not use sleep aid/s. Symptoms of sleep apnea have improved since using PAP therapy.   He is not snoring with the machine on.  He does complain of routinely feeling air leaking through his mouth around 3:30AM (then he sits up and he tries to take as deep as he can).   DME: [employer]  Mask: F&P Nova nasal pillow medium  Machine: ResMed Airsense 11 Autoset      DATA REVIEWED TODAY:    Diagnostic Review  HST on 3/31/2025 showed respiratory event index of 26.4/h with oxygen desaturation down to a socrates of 76%.     Jadwin           6/15/2025     3:31 PM 3/3/2025     5:54 PM   Sleep Medicine   Sitting and reading 2 2   Watching TV 1 2   Sitting, inactive in a public place (e.g. a theatre or a meeting) 1 1   As a passenger in a car for an hour without a break 1 1   Lying down to rest in the afternoon when circumstances permit 2 1   Sitting and talking to someone 1 1   Sitting quietly after a lunch without alcohol 0 2   In a car, while stopped for a few minutes in traffic 0 1   Jadwin Sleepiness Score 8  11    Neck (Inches)  18.25       Patient-reported       PAP Adherence (dates: 2025 - 2025)  Total days used: 44/48  % used days >= 4 hours: 90%  Average hours used per day: 7 hrs 6 mins  Mode: auto CPAP  Pressure settin-18 cmH2O  Average pressure (95%): 13.9

## 2025-06-18 NOTE — PATIENT INSTRUCTIONS
treating obstructive sleep apnea. Being at a healthy weight is improtant to prevent and/or facilitate treatment of chronic conditions such as heart disease and diabetes in addition to obstructive sleep apnea.     This is optimally achieved through a healthy diet and exercise program that can be maintained long term.       For more information please call [phone].          Drowsy Driving Tips    These suggestions will help prevent you from the risk of drowsy driving.    1.  If you feel tired or drowsy do not drive.  Sleepiness is a major cause of motor vehicle accidents and accounts for 40% of all fatal crashes reported on the [facility].  No matter how much you think you can control sleepiness, you can't.    2. Ensure you follow your doctor's advice about the treatment for your sleep disorder. For example, if you have sleep apnea and use CPAP, ensure you use it fully the night before your trip.    3. Get a good night's sleep before driving.  Do not reduce your sleep time if you plan a long drive the next day.  Get to bed early and do not stay up late packing.    4. Avoid alcohol both the night before your trip and the during your trip.  Alcohol will disrupt sleep and make you more tired the next day.  Sleepiness and alcohol are additive in increasing impairment of your driving ability.    5. Avoid any sedative medications, including sedative antihistamines that are often contained in cold or allergy medications, the night before you drive as they may have long lasting effects the next day.    6. Travel during non-sleeping hours.  Accidents due to sleepiness are more common during the nighttime hours.    7. If sleepy, stop and rest.  Drink coffee, walk around or have a brief nap in your car if you are sleepy.  Have a 10-15 minute break after every 2 hours of driving.    8. Drive with a .  Share the driving.  Relax in the back seat until it is your time to share the driving again.